# Patient Record
Sex: FEMALE | NOT HISPANIC OR LATINO
[De-identification: names, ages, dates, MRNs, and addresses within clinical notes are randomized per-mention and may not be internally consistent; named-entity substitution may affect disease eponyms.]

---

## 2021-08-27 ENCOUNTER — RESULT REVIEW (OUTPATIENT)
Age: 56
End: 2021-08-27

## 2022-01-13 PROBLEM — Z00.00 ENCOUNTER FOR PREVENTIVE HEALTH EXAMINATION: Status: ACTIVE | Noted: 2022-01-13

## 2022-01-21 ENCOUNTER — NON-APPOINTMENT (OUTPATIENT)
Age: 57
End: 2022-01-21

## 2022-01-24 ENCOUNTER — APPOINTMENT (OUTPATIENT)
Dept: SURGERY | Facility: CLINIC | Age: 57
End: 2022-01-24
Payer: COMMERCIAL

## 2022-01-24 VITALS
HEART RATE: 62 BPM | OXYGEN SATURATION: 99 % | DIASTOLIC BLOOD PRESSURE: 80 MMHG | HEIGHT: 67 IN | TEMPERATURE: 97 F | SYSTOLIC BLOOD PRESSURE: 131 MMHG | WEIGHT: 201 LBS | BODY MASS INDEX: 31.55 KG/M2

## 2022-01-24 DIAGNOSIS — Z80.3 FAMILY HISTORY OF MALIGNANT NEOPLASM OF BREAST: ICD-10-CM

## 2022-01-24 DIAGNOSIS — Z86.018 PERSONAL HISTORY OF OTHER BENIGN NEOPLASM: ICD-10-CM

## 2022-01-24 DIAGNOSIS — Z80.41 FAMILY HISTORY OF MALIGNANT NEOPLASM OF OVARY: ICD-10-CM

## 2022-01-24 DIAGNOSIS — R10.9 UNSPECIFIED ABDOMINAL PAIN: ICD-10-CM

## 2022-01-24 DIAGNOSIS — Z78.9 OTHER SPECIFIED HEALTH STATUS: ICD-10-CM

## 2022-01-24 DIAGNOSIS — Z80.42 FAMILY HISTORY OF MALIGNANT NEOPLASM OF PROSTATE: ICD-10-CM

## 2022-01-24 DIAGNOSIS — R14.0 ABDOMINAL DISTENSION (GASEOUS): ICD-10-CM

## 2022-01-24 PROCEDURE — 99214 OFFICE O/P EST MOD 30 MIN: CPT

## 2022-01-26 NOTE — ASSESSMENT
[FreeTextEntry1] : Case discussed/patient seen with attending, . 57 y/o female with longstanding abdominal discomfort. Clinically, there is no evidence of hernia or abdominal wall defect. Discussed plan for her to undergo a CT of the abdomen and pelvis to further evaluate abdominal discomfort and evaluate abdominal wall integrity. Will call patient with results. We discussed continuing to increase fluid intake, increase fiber in diet to keep stool soft and avoid constipation. All questions answered. Notably greater than 50% of today's 30 minute visit was spent on counseling and coordination of her care.

## 2022-01-26 NOTE — PHYSICAL EXAM
[Normal Breath Sounds] : Normal breath sounds [Normal Heart Sounds] : normal heart sounds [Alert] : alert [Oriented to Person] : oriented to person [Oriented to Place] : oriented to place [Oriented to Time] : oriented to time [Calm] : calm [Abdominal Masses] : No abdominal masses [Abdomen Tenderness] : ~T ~M No abdominal tenderness [Tender] : was nontender [Enlarged] : not enlarged [de-identified] : NAD, comfortable [de-identified] : Normocephalic, atraumatic. No scleral icterus.  [de-identified] : Supple, no JVD or cervical lymphadenopathy.  [de-identified] : No respiratory distress.  [de-identified] : +BS soft, nontender, nondistended. Do not appreciate any abdominal wall defect/hernia. \par  [de-identified] : Warm, dry.

## 2022-01-26 NOTE — HISTORY OF PRESENT ILLNESS
[de-identified] : This is a 55 y/o female presenting to the office for evaluation and management of abdominal discomfort. Referred by Dr.Richard Ford. She reports she has had chronic lower abdominal discomfort, Left side more than the right side x 2 year. Reports the discomfort sometime comes on spontaneously during the middle of the night and is relieved after a bowel movement. In the past year the abdominal discomfort has increased in frequency, occurring 2-3x/week. Reports her last episode of discomfort was 1 week ago during the night. Occasionally, the abdominal discomfort comes on during sexual activity, exercising and walking. Relieved by Aleve. Additionally, reports an increase in flatulence in the past month, denies any change in diet or increase dairy consumption. Reports she has had a colonoscopy in 2018. Has also had a vaginal ultrasound which revealed fibroid. Does not have results with her today. Denies any fever, nausea, vomiting, diarrhea or constipation.

## 2022-02-12 ENCOUNTER — TRANSCRIPTION ENCOUNTER (OUTPATIENT)
Age: 57
End: 2022-02-12

## 2022-02-12 ENCOUNTER — APPOINTMENT (OUTPATIENT)
Dept: CT IMAGING | Facility: HOSPITAL | Age: 57
End: 2022-02-12

## 2022-02-12 ENCOUNTER — OUTPATIENT (OUTPATIENT)
Dept: OUTPATIENT SERVICES | Facility: HOSPITAL | Age: 57
LOS: 1 days | End: 2022-02-12
Payer: COMMERCIAL

## 2022-02-12 ENCOUNTER — RESULT REVIEW (OUTPATIENT)
Age: 57
End: 2022-02-12

## 2022-02-12 PROCEDURE — 74177 CT ABD & PELVIS W/CONTRAST: CPT

## 2022-02-12 PROCEDURE — 74177 CT ABD & PELVIS W/CONTRAST: CPT | Mod: 26

## 2022-02-17 ENCOUNTER — NON-APPOINTMENT (OUTPATIENT)
Age: 57
End: 2022-02-17

## 2022-12-16 ENCOUNTER — APPOINTMENT (OUTPATIENT)
Dept: MAMMOGRAPHY | Facility: CLINIC | Age: 57
End: 2022-12-16